# Patient Record
Sex: FEMALE | Race: WHITE | URBAN - METROPOLITAN AREA
[De-identification: names, ages, dates, MRNs, and addresses within clinical notes are randomized per-mention and may not be internally consistent; named-entity substitution may affect disease eponyms.]

---

## 2022-04-27 ENCOUNTER — OFFICE VISIT (OUTPATIENT)
Dept: URBAN - METROPOLITAN AREA CLINIC 33 | Facility: CLINIC | Age: 82
End: 2022-04-27
Payer: MEDICARE

## 2022-04-27 DIAGNOSIS — H35.3132 NONEXUDATIVE MACULAR DEGENERATION, INTERMEDIATE DRY STAGE, BILATERAL: ICD-10-CM

## 2022-04-27 DIAGNOSIS — H52.4 PRESBYOPIA: ICD-10-CM

## 2022-04-27 DIAGNOSIS — H25.813 COMBINED FORMS OF AGE-RELATED CATARACT, BILATERAL: Primary | ICD-10-CM

## 2022-04-27 PROCEDURE — 92134 CPTRZ OPH DX IMG PST SGM RTA: CPT | Performed by: STUDENT IN AN ORGANIZED HEALTH CARE EDUCATION/TRAINING PROGRAM

## 2022-04-27 PROCEDURE — 92004 COMPRE OPH EXAM NEW PT 1/>: CPT | Performed by: STUDENT IN AN ORGANIZED HEALTH CARE EDUCATION/TRAINING PROGRAM

## 2022-04-27 ASSESSMENT — INTRAOCULAR PRESSURE
OS: 19
OD: 19

## 2022-04-27 ASSESSMENT — VISUAL ACUITY
OD: 20/70
OS: 20/80

## 2022-04-27 NOTE — IMPRESSION/PLAN
Impression: Combined forms of age-related cataract, bilateral: H25.813. Referred by Dr Navneet Grigsby for cat OD Plan: Pt not symptomatic for glare, primarily with difficulty reading. Pt ed not primary reason for reduced vision. No treatment recommended at this time.  Will cont to monitor; pt to seek care with vision changes

## 2022-04-27 NOTE — IMPRESSION/PLAN
Impression: Nonexudative macular degeneration, intermediate dry stage, bilateral: H35.7612. Plan: Primary cause of reduced vision. No e/o of SRF on poorly centered OCT mac. Flat on fundoscopy, atrophy OU with drusen OS>OD Pt ed to monitor vision monocularly with amsler grid and seek care with changes Cont AREDS2 BID po

## 2022-04-27 NOTE — IMPRESSION/PLAN
Impression: Presbyopia: H52.4. Plan: Rel SRx per pt request. Pt appreciated TL demo and understands limitations to vision.